# Patient Record
Sex: FEMALE | URBAN - METROPOLITAN AREA
[De-identification: names, ages, dates, MRNs, and addresses within clinical notes are randomized per-mention and may not be internally consistent; named-entity substitution may affect disease eponyms.]

---

## 2017-03-25 ENCOUNTER — INPATIENT (INPATIENT)
Facility: HOSPITAL | Age: 57
LOS: 2 days | Discharge: HOME | End: 2017-03-28
Attending: INTERNAL MEDICINE | Admitting: INTERNAL MEDICINE
Payer: MEDICAID

## 2017-03-25 VITALS
SYSTOLIC BLOOD PRESSURE: 163 MMHG | OXYGEN SATURATION: 100 % | HEART RATE: 67 BPM | RESPIRATION RATE: 18 BRPM | TEMPERATURE: 98 F | DIASTOLIC BLOOD PRESSURE: 102 MMHG | WEIGHT: 143.3 LBS

## 2017-03-25 DIAGNOSIS — I21.19 ST ELEVATION (STEMI) MYOCARDIAL INFARCTION INVOLVING OTHER CORONARY ARTERY OF INFERIOR WALL: ICD-10-CM

## 2017-03-25 DIAGNOSIS — R07.9 CHEST PAIN, UNSPECIFIED: ICD-10-CM

## 2017-03-25 LAB
ALBUMIN SERPL ELPH-MCNC: 4.3 G/DL — SIGNIFICANT CHANGE UP (ref 3.3–5)
ALP SERPL-CCNC: 80 U/L — SIGNIFICANT CHANGE UP (ref 40–120)
ALT FLD-CCNC: 30 U/L — SIGNIFICANT CHANGE UP (ref 4–33)
APTT BLD: 32.5 SEC — SIGNIFICANT CHANGE UP (ref 27.5–37.4)
AST SERPL-CCNC: 32 U/L — SIGNIFICANT CHANGE UP (ref 4–32)
BASOPHILS # BLD AUTO: 0.03 K/UL — SIGNIFICANT CHANGE UP (ref 0–0.2)
BASOPHILS NFR BLD AUTO: 0.3 % — SIGNIFICANT CHANGE UP (ref 0–2)
BILIRUB SERPL-MCNC: 0.4 MG/DL — SIGNIFICANT CHANGE UP (ref 0.2–1.2)
BLD GP AB SCN SERPL QL: NEGATIVE — SIGNIFICANT CHANGE UP
BUN SERPL-MCNC: 17 MG/DL — SIGNIFICANT CHANGE UP (ref 7–23)
CALCIUM SERPL-MCNC: 9.1 MG/DL — SIGNIFICANT CHANGE UP (ref 8.4–10.5)
CHLORIDE SERPL-SCNC: 95 MMOL/L — LOW (ref 98–107)
CK MB BLD-MCNC: 2.97 NG/ML — SIGNIFICANT CHANGE UP (ref 1–4.7)
CK MB BLD-MCNC: 359.3 NG/ML — HIGH (ref 1–4.7)
CK MB BLD-MCNC: SIGNIFICANT CHANGE UP (ref 0–2.5)
CK SERPL-CCNC: 1881 U/L — HIGH (ref 25–170)
CK SERPL-CCNC: 57 U/L — SIGNIFICANT CHANGE UP (ref 25–170)
CO2 SERPL-SCNC: 23 MMOL/L — SIGNIFICANT CHANGE UP (ref 22–31)
CREAT SERPL-MCNC: 0.78 MG/DL — SIGNIFICANT CHANGE UP (ref 0.5–1.3)
EOSINOPHIL # BLD AUTO: 0.07 K/UL — SIGNIFICANT CHANGE UP (ref 0–0.5)
EOSINOPHIL NFR BLD AUTO: 0.6 % — SIGNIFICANT CHANGE UP (ref 0–6)
GLUCOSE SERPL-MCNC: 265 MG/DL — HIGH (ref 70–99)
HBA1C BLD-MCNC: 8.5 % — HIGH (ref 4–5.6)
HCT VFR BLD CALC: 41.5 % — SIGNIFICANT CHANGE UP (ref 34.5–45)
HGB BLD-MCNC: 13.5 G/DL — SIGNIFICANT CHANGE UP (ref 11.5–15.5)
IMM GRANULOCYTES NFR BLD AUTO: 0.7 % — SIGNIFICANT CHANGE UP (ref 0–1.5)
INR BLD: 0.99 — SIGNIFICANT CHANGE UP (ref 0.88–1.17)
LYMPHOCYTES # BLD AUTO: 2.39 K/UL — SIGNIFICANT CHANGE UP (ref 1–3.3)
LYMPHOCYTES # BLD AUTO: 22.1 % — SIGNIFICANT CHANGE UP (ref 13–44)
MCHC RBC-ENTMCNC: 24.4 PG — LOW (ref 27–34)
MCHC RBC-ENTMCNC: 32.5 % — SIGNIFICANT CHANGE UP (ref 32–36)
MCV RBC AUTO: 74.9 FL — LOW (ref 80–100)
MONOCYTES # BLD AUTO: 0.38 K/UL — SIGNIFICANT CHANGE UP (ref 0–0.9)
MONOCYTES NFR BLD AUTO: 3.5 % — SIGNIFICANT CHANGE UP (ref 2–14)
NEUTROPHILS # BLD AUTO: 7.88 K/UL — HIGH (ref 1.8–7.4)
NEUTROPHILS NFR BLD AUTO: 72.8 % — SIGNIFICANT CHANGE UP (ref 43–77)
PLATELET # BLD AUTO: 223 K/UL — SIGNIFICANT CHANGE UP (ref 150–400)
PMV BLD: 10.7 FL — SIGNIFICANT CHANGE UP (ref 7–13)
POTASSIUM SERPL-MCNC: 4.4 MMOL/L — SIGNIFICANT CHANGE UP (ref 3.5–5.3)
POTASSIUM SERPL-SCNC: 4.4 MMOL/L — SIGNIFICANT CHANGE UP (ref 3.5–5.3)
PROT SERPL-MCNC: 7.6 G/DL — SIGNIFICANT CHANGE UP (ref 6–8.3)
PROTHROM AB SERPL-ACNC: 11.1 SEC — SIGNIFICANT CHANGE UP (ref 9.8–13.1)
RBC # BLD: 5.54 M/UL — HIGH (ref 3.8–5.2)
RBC # FLD: 15.5 % — HIGH (ref 10.3–14.5)
RH IG SCN BLD-IMP: POSITIVE — SIGNIFICANT CHANGE UP
SODIUM SERPL-SCNC: 136 MMOL/L — SIGNIFICANT CHANGE UP (ref 135–145)
TROPONIN T SERPL-MCNC: 5.11 NG/ML — HIGH (ref 0–0.06)
TROPONIN T SERPL-MCNC: < 0.06 NG/ML — SIGNIFICANT CHANGE UP (ref 0–0.06)
WBC # BLD: 10.83 K/UL — HIGH (ref 3.8–10.5)
WBC # FLD AUTO: 10.83 K/UL — HIGH (ref 3.8–10.5)

## 2017-03-25 PROCEDURE — 71010: CPT | Mod: 26

## 2017-03-25 PROCEDURE — 93458 L HRT ARTERY/VENTRICLE ANGIO: CPT | Mod: 26,59

## 2017-03-25 PROCEDURE — 92941 PRQ TRLML REVSC TOT OCCL AMI: CPT | Mod: LC

## 2017-03-25 PROCEDURE — 93010 ELECTROCARDIOGRAM REPORT: CPT

## 2017-03-25 RX ORDER — ASPIRIN/CALCIUM CARB/MAGNESIUM 324 MG
324 TABLET ORAL ONCE
Qty: 0 | Refills: 0 | Status: COMPLETED | OUTPATIENT
Start: 2017-03-25 | End: 2017-03-25

## 2017-03-25 RX ORDER — DEXTROSE 50 % IN WATER 50 %
25 SYRINGE (ML) INTRAVENOUS ONCE
Qty: 0 | Refills: 0 | Status: DISCONTINUED | OUTPATIENT
Start: 2017-03-25 | End: 2017-03-28

## 2017-03-25 RX ORDER — INSULIN LISPRO 100/ML
VIAL (ML) SUBCUTANEOUS
Qty: 0 | Refills: 0 | Status: DISCONTINUED | OUTPATIENT
Start: 2017-03-25 | End: 2017-03-28

## 2017-03-25 RX ORDER — DEXTROSE 50 % IN WATER 50 %
12.5 SYRINGE (ML) INTRAVENOUS ONCE
Qty: 0 | Refills: 0 | Status: DISCONTINUED | OUTPATIENT
Start: 2017-03-25 | End: 2017-03-28

## 2017-03-25 RX ORDER — TICAGRELOR 90 MG/1
90 TABLET ORAL
Qty: 0 | Refills: 0 | Status: DISCONTINUED | OUTPATIENT
Start: 2017-03-26 | End: 2017-03-28

## 2017-03-25 RX ORDER — HEPARIN SODIUM 5000 [USP'U]/ML
5000 INJECTION INTRAVENOUS; SUBCUTANEOUS EVERY 8 HOURS
Qty: 0 | Refills: 0 | Status: DISCONTINUED | OUTPATIENT
Start: 2017-03-25 | End: 2017-03-28

## 2017-03-25 RX ORDER — SODIUM CHLORIDE 9 MG/ML
1000 INJECTION, SOLUTION INTRAVENOUS
Qty: 0 | Refills: 0 | Status: DISCONTINUED | OUTPATIENT
Start: 2017-03-25 | End: 2017-03-28

## 2017-03-25 RX ORDER — ASPIRIN/CALCIUM CARB/MAGNESIUM 324 MG
325 TABLET ORAL ONCE
Qty: 0 | Refills: 0 | Status: DISCONTINUED | OUTPATIENT
Start: 2017-03-25 | End: 2017-03-25

## 2017-03-25 RX ORDER — HEPARIN SODIUM 5000 [USP'U]/ML
5000 INJECTION INTRAVENOUS; SUBCUTANEOUS ONCE
Qty: 0 | Refills: 0 | Status: COMPLETED | OUTPATIENT
Start: 2017-03-25 | End: 2017-03-25

## 2017-03-25 RX ORDER — ASPIRIN/CALCIUM CARB/MAGNESIUM 324 MG
81 TABLET ORAL DAILY
Qty: 0 | Refills: 0 | Status: DISCONTINUED | OUTPATIENT
Start: 2017-03-26 | End: 2017-03-28

## 2017-03-25 RX ORDER — DEXTROSE 50 % IN WATER 50 %
1 SYRINGE (ML) INTRAVENOUS ONCE
Qty: 0 | Refills: 0 | Status: DISCONTINUED | OUTPATIENT
Start: 2017-03-25 | End: 2017-03-28

## 2017-03-25 RX ORDER — INFLUENZA VIRUS VACCINE 15; 15; 15; 15 UG/.5ML; UG/.5ML; UG/.5ML; UG/.5ML
0.5 SUSPENSION INTRAMUSCULAR ONCE
Qty: 0 | Refills: 0 | Status: COMPLETED | OUTPATIENT
Start: 2017-03-25 | End: 2017-03-28

## 2017-03-25 RX ORDER — INSULIN LISPRO 100/ML
VIAL (ML) SUBCUTANEOUS AT BEDTIME
Qty: 0 | Refills: 0 | Status: DISCONTINUED | OUTPATIENT
Start: 2017-03-25 | End: 2017-03-28

## 2017-03-25 RX ORDER — GLUCAGON INJECTION, SOLUTION 0.5 MG/.1ML
1 INJECTION, SOLUTION SUBCUTANEOUS ONCE
Qty: 0 | Refills: 0 | Status: DISCONTINUED | OUTPATIENT
Start: 2017-03-25 | End: 2017-03-28

## 2017-03-25 RX ORDER — TICAGRELOR 90 MG/1
180 TABLET ORAL ONCE
Qty: 0 | Refills: 0 | Status: COMPLETED | OUTPATIENT
Start: 2017-03-25 | End: 2017-03-25

## 2017-03-25 RX ORDER — ATORVASTATIN CALCIUM 80 MG/1
80 TABLET, FILM COATED ORAL AT BEDTIME
Qty: 0 | Refills: 0 | Status: DISCONTINUED | OUTPATIENT
Start: 2017-03-25 | End: 2017-03-28

## 2017-03-25 RX ORDER — ASPIRIN/CALCIUM CARB/MAGNESIUM 324 MG
364 TABLET ORAL DAILY
Qty: 0 | Refills: 0 | Status: DISCONTINUED | OUTPATIENT
Start: 2017-03-25 | End: 2017-03-25

## 2017-03-25 RX ADMIN — ATORVASTATIN CALCIUM 80 MILLIGRAM(S): 80 TABLET, FILM COATED ORAL at 22:31

## 2017-03-25 RX ADMIN — HEPARIN SODIUM 30 UNIT(S): 5000 INJECTION INTRAVENOUS; SUBCUTANEOUS at 11:08

## 2017-03-25 RX ADMIN — HEPARIN SODIUM 5000 UNIT(S): 5000 INJECTION INTRAVENOUS; SUBCUTANEOUS at 22:31

## 2017-03-25 RX ADMIN — Medication 324 MILLIGRAM(S): at 11:05

## 2017-03-25 RX ADMIN — Medication 1: at 17:57

## 2017-03-25 RX ADMIN — HEPARIN SODIUM 5000 UNIT(S): 5000 INJECTION INTRAVENOUS; SUBCUTANEOUS at 14:42

## 2017-03-25 RX ADMIN — TICAGRELOR 180 MILLIGRAM(S): 90 TABLET ORAL at 11:05

## 2017-03-25 NOTE — H&P ADULT. - PROBLEM SELECTOR PLAN 1
Admit to CCU, trend enzymes post PCI to OM, continue DAPT, lipitor, eventual start ACE and betablocker, ECHO

## 2017-03-25 NOTE — ED PROVIDER NOTE - PROGRESS NOTE DETAILS
Dr. Hodge: Signed EKG which shows JARED inferior, pt assessed, c/o nonradiating cp, no other complaints, no PMHx. Pt immediately placed in wheelchair and wheeled over to room 18 and discussed with Dr. Kenny, who will call 22 for STEMI alert. Cardiology contacted, cath lab activated, pt received brilinta, heparin, aspirin, SL nitro Pt transported to cath lab

## 2017-03-25 NOTE — ED ADULT TRIAGE NOTE - CHIEF COMPLAINT QUOTE
Co midsternal chest pain radiating to left side of chest since yesterday. Co sob. Vomited x1 this morning. Denies medical hx.

## 2017-03-25 NOTE — ED ADULT NURSE NOTE - CAS DISCH TRANSFER METHOD
to [facility] cath Lab, report given to RN from [facility] to [facility] cath Lab, report given to RN from CCU St[facility], accompanied by LETHA Ibarra and cardiologist to Cath Lab

## 2017-03-25 NOTE — ED PROVIDER NOTE - ATTENDING CONTRIBUTION TO CARE
57yo  F, denies PMH, c/o episodic sharp sternal CP yest, 5-10min., then would resolve for 30min or more, on and off all day, thought worse with minor exertion around home. Went to bed pain-free, awoke with severe pain, sweats, vomiting, pain improved but did not resolve and came to ED. Appears comfortable at rest, no diaphoresis at bedside, clear lungs, NT CW, no murmur, soft, sl. distended, NT abd, NT calves, no edema.

## 2017-03-25 NOTE — ED ADULT NURSE REASSESSMENT NOTE - NS ED NURSE REASSESS COMMENT FT1
pt coming with Mid. sternal CP interm. since yest. with persistant pain this AM with + nausea vomited few times pt AOX 3, all extremities + for equal movements, no slur peach/no facial drop noted.     Denies dizziness/SOB at present time.   repeat EKG done with "STEMI" Cardiology team call by Attending,  all labs done, S.L. to left AC 20g angio cath.   1 Nitro S.L. given by Efraín place pt on CM, oxygen 2-3l cn in place.  Meds given by Primary RN, pt transported by Cath Lab team in stable condition.   Consent signed/pt's belongings with pt.    Layne Barry RN (facilitatory )

## 2017-03-25 NOTE — ED PROVIDER NOTE - CRITICAL CARE PROVIDED
consultation with other physicians/documentation/direct patient care (not related to procedure)/additional history taking/interpretation of diagnostic studies

## 2017-03-25 NOTE — PATIENT PROFILE ADULT. - NS PRO INDICAT FLU
Patient/surrogate requesting vaccine/Patient is 18 to 64 years of age with chronic diseases or conditions

## 2017-03-25 NOTE — ED PROVIDER NOTE - OBJECTIVE STATEMENT
55 yo F no PMH p/w CP began yesterday that was waxes and waning that she awoke this AM that did not resolve. Pain severe, sub-sternal came on when pt was doing physical activity, un-relenting with tylenol, EKG showed inferior wall STEMI. 55 yo F no PMH p/w CP began yesterday that was waxes and waning that she awoke this AM with more severe pain, diaphoresis and vomiting episode. Pain cont. in ED, milder, sub-sternal, rad. sl. to L side, un-relenting with tylenol, EKG showed inferior wall STEMI.

## 2017-03-25 NOTE — H&P ADULT. - HISTORY OF PRESENT ILLNESS
56F with no known medical history presents with one h/o chest pain increasing in intensity and associated with SOB. EKG in ER showed IWSTEMI, patient taken to cath lab where one MYRIAM was placed to OM, LAD with 100% blockage but with collaterals. Patient transferred to CCU in stable condition. 56F with no known medical history presents with one day h/o chest pain increasing in intensity and associated with SOB. EKG in ER showed IWSTEMI, patient taken to cath lab where one MYRIAM was placed to OM, LAD with 100% blockage but with collaterals. Patient transferred to CCU in stable condition.

## 2017-03-25 NOTE — ED PROVIDER NOTE - MEDICAL DECISION MAKING DETAILS
55yo F with 1mm inf wall JARED, reciprocal changes I,L, scanned to cath team, will activate cath lab.

## 2017-03-26 LAB
ALBUMIN SERPL ELPH-MCNC: 3.7 G/DL — SIGNIFICANT CHANGE UP (ref 3.3–5)
ALP SERPL-CCNC: 69 U/L — SIGNIFICANT CHANGE UP (ref 40–120)
ALT FLD-CCNC: 44 U/L — HIGH (ref 4–33)
AST SERPL-CCNC: 142 U/L — HIGH (ref 4–32)
BILIRUB SERPL-MCNC: 0.6 MG/DL — SIGNIFICANT CHANGE UP (ref 0.2–1.2)
BUN SERPL-MCNC: 13 MG/DL — SIGNIFICANT CHANGE UP (ref 7–23)
CALCIUM SERPL-MCNC: 9 MG/DL — SIGNIFICANT CHANGE UP (ref 8.4–10.5)
CHLORIDE SERPL-SCNC: 102 MMOL/L — SIGNIFICANT CHANGE UP (ref 98–107)
CHOLEST SERPL-MCNC: 249 MG/DL — HIGH (ref 120–199)
CK MB BLD-MCNC: 13.9 — HIGH (ref 0–2.5)
CK MB BLD-MCNC: 16.6 — HIGH (ref 0–2.5)
CK MB BLD-MCNC: 164.8 NG/ML — HIGH (ref 1–4.7)
CK MB BLD-MCNC: 278.7 NG/ML — HIGH (ref 1–4.7)
CK SERPL-CCNC: 1183 U/L — HIGH (ref 25–170)
CK SERPL-CCNC: 1683 U/L — HIGH (ref 25–170)
CO2 SERPL-SCNC: 21 MMOL/L — LOW (ref 22–31)
CREAT SERPL-MCNC: 0.63 MG/DL — SIGNIFICANT CHANGE UP (ref 0.5–1.3)
GLUCOSE SERPL-MCNC: 188 MG/DL — HIGH (ref 70–99)
HCT VFR BLD CALC: 40.3 % — SIGNIFICANT CHANGE UP (ref 34.5–45)
HDLC SERPL-MCNC: 32 MG/DL — LOW (ref 45–65)
HGB BLD-MCNC: 13.4 G/DL — SIGNIFICANT CHANGE UP (ref 11.5–15.5)
LIPID PNL WITH DIRECT LDL SERPL: 168 MG/DL — SIGNIFICANT CHANGE UP
MAGNESIUM SERPL-MCNC: 1.8 MG/DL — SIGNIFICANT CHANGE UP (ref 1.6–2.6)
MCHC RBC-ENTMCNC: 24.7 PG — LOW (ref 27–34)
MCHC RBC-ENTMCNC: 33.3 % — SIGNIFICANT CHANGE UP (ref 32–36)
MCV RBC AUTO: 74.2 FL — LOW (ref 80–100)
PHOSPHATE SERPL-MCNC: 3.3 MG/DL — SIGNIFICANT CHANGE UP (ref 2.5–4.5)
PLATELET # BLD AUTO: 225 K/UL — SIGNIFICANT CHANGE UP (ref 150–400)
PMV BLD: 11.6 FL — SIGNIFICANT CHANGE UP (ref 7–13)
POTASSIUM SERPL-MCNC: 4.4 MMOL/L — SIGNIFICANT CHANGE UP (ref 3.5–5.3)
POTASSIUM SERPL-SCNC: 4.4 MMOL/L — SIGNIFICANT CHANGE UP (ref 3.5–5.3)
PROT SERPL-MCNC: 7 G/DL — SIGNIFICANT CHANGE UP (ref 6–8.3)
RBC # BLD: 5.43 M/UL — HIGH (ref 3.8–5.2)
RBC # FLD: 15.4 % — HIGH (ref 10.3–14.5)
SODIUM SERPL-SCNC: 141 MMOL/L — SIGNIFICANT CHANGE UP (ref 135–145)
T3 SERPL-MCNC: 95 NG/DL — SIGNIFICANT CHANGE UP (ref 80–200)
T4 AB SER-ACNC: 7.92 UG/DL — SIGNIFICANT CHANGE UP (ref 5.1–13)
TRIGL SERPL-MCNC: 414 MG/DL — HIGH (ref 10–149)
TROPONIN T SERPL-MCNC: 3.93 NG/ML — HIGH (ref 0–0.06)
TROPONIN T SERPL-MCNC: 6.04 NG/ML — HIGH (ref 0–0.06)
TSH SERPL-MCNC: 3.38 UIU/ML — SIGNIFICANT CHANGE UP (ref 0.27–4.2)
WBC # BLD: 9.84 K/UL — SIGNIFICANT CHANGE UP (ref 3.8–10.5)
WBC # FLD AUTO: 9.84 K/UL — SIGNIFICANT CHANGE UP (ref 3.8–10.5)

## 2017-03-26 PROCEDURE — 93010 ELECTROCARDIOGRAM REPORT: CPT

## 2017-03-26 PROCEDURE — 93306 TTE W/DOPPLER COMPLETE: CPT | Mod: 26

## 2017-03-26 RX ORDER — METOPROLOL TARTRATE 50 MG
12.5 TABLET ORAL
Qty: 0 | Refills: 0 | Status: DISCONTINUED | OUTPATIENT
Start: 2017-03-26 | End: 2017-03-28

## 2017-03-26 RX ADMIN — TICAGRELOR 90 MILLIGRAM(S): 90 TABLET ORAL at 05:58

## 2017-03-26 RX ADMIN — Medication: at 14:19

## 2017-03-26 RX ADMIN — ATORVASTATIN CALCIUM 80 MILLIGRAM(S): 80 TABLET, FILM COATED ORAL at 21:22

## 2017-03-26 RX ADMIN — Medication 12.5 MILLIGRAM(S): at 18:03

## 2017-03-26 RX ADMIN — Medication 1: at 09:56

## 2017-03-26 RX ADMIN — HEPARIN SODIUM 5000 UNIT(S): 5000 INJECTION INTRAVENOUS; SUBCUTANEOUS at 05:58

## 2017-03-26 RX ADMIN — TICAGRELOR 90 MILLIGRAM(S): 90 TABLET ORAL at 18:04

## 2017-03-26 RX ADMIN — Medication 12.5 MILLIGRAM(S): at 05:57

## 2017-03-26 RX ADMIN — HEPARIN SODIUM 5000 UNIT(S): 5000 INJECTION INTRAVENOUS; SUBCUTANEOUS at 14:28

## 2017-03-26 RX ADMIN — Medication 81 MILLIGRAM(S): at 12:26

## 2017-03-26 RX ADMIN — HEPARIN SODIUM 5000 UNIT(S): 5000 INJECTION INTRAVENOUS; SUBCUTANEOUS at 21:22

## 2017-03-27 LAB
ALBUMIN SERPL ELPH-MCNC: 3.8 G/DL — SIGNIFICANT CHANGE UP (ref 3.3–5)
ALP SERPL-CCNC: 75 U/L — SIGNIFICANT CHANGE UP (ref 40–120)
ALT FLD-CCNC: 37 U/L — HIGH (ref 4–33)
AST SERPL-CCNC: 65 U/L — HIGH (ref 4–32)
BILIRUB SERPL-MCNC: 0.9 MG/DL — SIGNIFICANT CHANGE UP (ref 0.2–1.2)
BUN SERPL-MCNC: 16 MG/DL — SIGNIFICANT CHANGE UP (ref 7–23)
CALCIUM SERPL-MCNC: 9.3 MG/DL — SIGNIFICANT CHANGE UP (ref 8.4–10.5)
CHLORIDE SERPL-SCNC: 100 MMOL/L — SIGNIFICANT CHANGE UP (ref 98–107)
CK MB BLD-MCNC: 15.18 NG/ML — HIGH (ref 1–4.7)
CK SERPL-CCNC: 346 U/L — HIGH (ref 25–170)
CO2 SERPL-SCNC: 20 MMOL/L — LOW (ref 22–31)
CREAT SERPL-MCNC: 0.78 MG/DL — SIGNIFICANT CHANGE UP (ref 0.5–1.3)
GLUCOSE SERPL-MCNC: 182 MG/DL — HIGH (ref 70–99)
HCT VFR BLD CALC: 42 % — SIGNIFICANT CHANGE UP (ref 34.5–45)
HGB BLD-MCNC: 13.8 G/DL — SIGNIFICANT CHANGE UP (ref 11.5–15.5)
MCHC RBC-ENTMCNC: 24.7 PG — LOW (ref 27–34)
MCHC RBC-ENTMCNC: 32.9 % — SIGNIFICANT CHANGE UP (ref 32–36)
MCV RBC AUTO: 75.1 FL — LOW (ref 80–100)
PLATELET # BLD AUTO: 245 K/UL — SIGNIFICANT CHANGE UP (ref 150–400)
PMV BLD: 11.7 FL — SIGNIFICANT CHANGE UP (ref 7–13)
POTASSIUM SERPL-MCNC: 4.5 MMOL/L — SIGNIFICANT CHANGE UP (ref 3.5–5.3)
POTASSIUM SERPL-SCNC: 4.5 MMOL/L — SIGNIFICANT CHANGE UP (ref 3.5–5.3)
PROT SERPL-MCNC: 7.5 G/DL — SIGNIFICANT CHANGE UP (ref 6–8.3)
RBC # BLD: 5.59 M/UL — HIGH (ref 3.8–5.2)
RBC # FLD: 15.3 % — HIGH (ref 10.3–14.5)
SODIUM SERPL-SCNC: 141 MMOL/L — SIGNIFICANT CHANGE UP (ref 135–145)
TROPONIN T SERPL-MCNC: 2.24 NG/ML — HIGH (ref 0–0.06)
WBC # BLD: 13.48 K/UL — HIGH (ref 3.8–10.5)
WBC # FLD AUTO: 13.48 K/UL — HIGH (ref 3.8–10.5)

## 2017-03-27 PROCEDURE — 93010 ELECTROCARDIOGRAM REPORT: CPT

## 2017-03-27 PROCEDURE — 99233 SBSQ HOSP IP/OBS HIGH 50: CPT

## 2017-03-27 RX ORDER — METFORMIN HYDROCHLORIDE 850 MG/1
1 TABLET ORAL
Qty: 60 | Refills: 0 | OUTPATIENT
Start: 2017-03-27 | End: 2017-04-26

## 2017-03-27 RX ORDER — METOPROLOL TARTRATE 50 MG
0.5 TABLET ORAL
Qty: 30 | Refills: 0 | OUTPATIENT
Start: 2017-03-27 | End: 2017-04-26

## 2017-03-27 RX ORDER — ATORVASTATIN CALCIUM 80 MG/1
1 TABLET, FILM COATED ORAL
Qty: 30 | Refills: 0 | OUTPATIENT
Start: 2017-03-27 | End: 2017-04-26

## 2017-03-27 RX ORDER — TICAGRELOR 90 MG/1
1 TABLET ORAL
Qty: 60 | Refills: 0 | OUTPATIENT
Start: 2017-03-27 | End: 2017-04-26

## 2017-03-27 RX ORDER — ASPIRIN/CALCIUM CARB/MAGNESIUM 324 MG
1 TABLET ORAL
Qty: 30 | Refills: 0 | OUTPATIENT
Start: 2017-03-27 | End: 2017-04-26

## 2017-03-27 RX ADMIN — Medication 1: at 09:00

## 2017-03-27 RX ADMIN — Medication 81 MILLIGRAM(S): at 11:49

## 2017-03-27 RX ADMIN — Medication 12.5 MILLIGRAM(S): at 18:19

## 2017-03-27 RX ADMIN — HEPARIN SODIUM 5000 UNIT(S): 5000 INJECTION INTRAVENOUS; SUBCUTANEOUS at 22:05

## 2017-03-27 RX ADMIN — Medication 12.5 MILLIGRAM(S): at 05:45

## 2017-03-27 RX ADMIN — TICAGRELOR 90 MILLIGRAM(S): 90 TABLET ORAL at 05:45

## 2017-03-27 RX ADMIN — HEPARIN SODIUM 5000 UNIT(S): 5000 INJECTION INTRAVENOUS; SUBCUTANEOUS at 05:45

## 2017-03-27 RX ADMIN — HEPARIN SODIUM 5000 UNIT(S): 5000 INJECTION INTRAVENOUS; SUBCUTANEOUS at 15:00

## 2017-03-27 RX ADMIN — ATORVASTATIN CALCIUM 80 MILLIGRAM(S): 80 TABLET, FILM COATED ORAL at 22:05

## 2017-03-27 RX ADMIN — Medication 3: at 12:45

## 2017-03-27 RX ADMIN — TICAGRELOR 90 MILLIGRAM(S): 90 TABLET ORAL at 18:19

## 2017-03-27 NOTE — DISCHARGE NOTE ADULT - SECONDARY DIAGNOSIS.
Type 2 diabetes mellitus Type 2 diabetes mellitus with hyperglycemia, without long-term current use of insulin

## 2017-03-27 NOTE — DIETITIAN INITIAL EVALUATION ADULT. - OTHER INFO
Nutrition consult received on 3/25/17 for Hemoglobin A1C > 7% . Pt. reports good appetite, PO intake, no known food allergies, was taking apple cider vinegar prior to admission for wt. loss. Was not on therapeutic diet , is newly diagnosed diabetic , received therapeutic diet instruction.

## 2017-03-27 NOTE — DISCHARGE NOTE ADULT - PATIENT PORTAL LINK FT
“You can access the Follow[employer] Patient Portal, offered by [facility], by registering with the following website: [email or web address]”

## 2017-03-27 NOTE — DISCHARGE NOTE ADULT - CARE PROVIDERS DIRECT ADDRESSES
[email or web address]

## 2017-03-27 NOTE — DISCHARGE NOTE ADULT - MEDICATION SUMMARY - MEDICATIONS TO TAKE
I will START or STAY ON the medications listed below when I get home from the hospital:    Lancets  -- Lancets   Test blood sugar #_2_ times daily  Disp  #_3_ boxes    -- Indication: For T2DM    test strips  -- Glucose test strips   Test blood sugar #_2_ times daily  Dis_3  boxes    -- Indication: For Type 2 diabetes mellitus    Glucometer  -- Medtronic glucometer  -- Indication: For Type 2 diabetes mellitus    aspirin 81 mg oral delayed release tablet  -- 1 tab(s) by mouth once a day  -- Indication: For CAD    enalapril 5 mg oral tablet  -- 1 tab(s) by mouth once a day  -- Indication: For CAD    metFORMIN 500 mg oral tablet, extended release  -- 1 tab(s) by mouth 2 times a day  -- Check with your doctor before becoming pregnant.  Do not drink alcoholic beverages when taking this medication.  Obtain medical advice before taking any non-prescription drugs as some may affect the action of this medication.  Swallow whole.  Do not crush.  Take with food or milk.    -- Indication: For T2DM    atorvastatin 80 mg oral tablet  -- 1 tab(s) by mouth once a day (at bedtime)  -- Indication: For CAD    ticagrelor 90 mg oral tablet  -- 1 tab(s) by mouth 2 times a day  -- Indication: For CAD    metoprolol tartrate 25 mg oral tablet  -- 0.5 tab(s) by mouth 2 times a day  -- It is very important that you take or use this exactly as directed.  Do not skip doses or discontinue unless directed by your doctor.  May cause drowsiness.  Alcohol may intensify this effect.  Use care when operating dangerous machinery.  Some non-prescription drugs may aggravate your condition.  Read all labels carefully.  If a warning appears, check with your doctor before taking.  Take with food or milk.  This drug may impair the ability to drive or operate machinery.  Use care until you become familiar with its effects.    -- Indication: For CAD

## 2017-03-27 NOTE — DISCHARGE NOTE ADULT - PLAN OF CARE
Medical management You had a cardiac catheterization performed due to your IWSTEMI. It showed occlusion of OM prompting MYRIAM placement along with near occlusion of LAD with collateral flow. You were monitored for 72 hr following the MI with no further events of chest pain. You have been started on Aspirin, brilinta, lipitor, metoprolol and enalapril for management of your CAD. Please follow up with cardiologist for further management. If you reexperience chest pain please seek emergent medical attention. Your A1C is elevated to 8.5 confirming a diagnosis of diabetes mellitus. Please take your metformin as prescribed to provide blood glucose control. Please measure your blood sugar as educated two times a day. Follow up with your PCP Dr. Maria Esther Rojo on 3/30 at 11:30 to discuss your diagnosis and management going forward. You had a cardiac catheterization performed due to your IWSTEMI. It showed occlusion of OM prompting MYRIAM placement along with near occlusion of LAD with collateral flow. You were monitored for 72 hr following the MI with no further events of chest pain. You have been started on Aspirin, brilinta, lipitor, metoprolol and enalapril for management of your CAD. Please follow up with Dr. Medel for further evaluation of need for further intervention and a cardiologist in future for further management . If you reexperience chest pain or SOB on exertion please seek emergent medical attention.

## 2017-03-27 NOTE — DISCHARGE NOTE ADULT - HOSPITAL COURSE
56F with no known medical history presents with one day h/o chest pain increasing in intensity and associated with SOB. EKG in ER showed IWSTEMI, patient taken to cath lab where one MYRIAM was placed to OM, LAD with 100% blockage but with collaterals. Patient transferred to CCU in stable condition.  Patient continued to be stable in post-MI period. Angioseal was placed on R groin and pt initially had pain but it resolved 56F with no known medical history presents with one day h/o chest pain increasing in intensity and associated with SOB. EKG in ER showed IWSTEMI, patient taken to cath lab where one MYRIAM was placed to OM, LAD with 100% blockage but with collaterals. Patient transferred to CCU in stable condition.  Patient continued to be stable in post-MI period. Angioseal was placed on R groin and pt initially had pain but it resolved. Her A1C was elevated to 8.5 making her newly dx T2DM prompting diabetes education. After discussion with the interventional cardiologist staging procedure was decided against. Patient was monitored for full 72 hrs following IWSTEMI and was cleared for discharge by cardiology. 56F with no known medical history presents with one day h/o chest pain increasing in intensity and associated with SOB. EKG in ER showed IWSTEMI, patient taken to cath lab where one MYRIAM was placed to OM, LAD with 100% blockage but with collaterals. Patient transferred to CCU in stable condition.  Patient continued to be stable in post-MI period. Angioseal was placed on R groin and pt initially had pain but it resolved. Her A1C was elevated to 8.5 making her newly dx T2DM prompting diabetes education and prescribing metformin on discharge. After discussion with the interventional cardiologist staging procedure was decided against. Patient was monitored for full 72 hrs following IWSTEMI and was cleared for discharge by cardiology.

## 2017-03-27 NOTE — DISCHARGE NOTE ADULT - NS AS DC FOLLOWUP STROKE INST
MI/Diabetes, diet and exercise/Smoking Cessation Influenza vaccination ([facility] Pub Date: August 19, 2014)/Diabetes, diet and exercise/MI/Smoking Cessation

## 2017-03-27 NOTE — DISCHARGE NOTE ADULT - CARE PLAN
Principal Discharge DX:	Acute myocardial infarction of inferior wall, initial episode of care  Goal:	Medical management  Instructions for follow-up, activity and diet:	You had a cardiac catheterization performed due to your IWSTEMI. It showed occlusion of OM prompting MYRIAM placement along with near occlusion of LAD with collateral flow. You were monitored for 72 hr following the MI with no further events of chest pain. You have been started on Aspirin, brilinta, lipitor, metoprolol and enalapril for management of your CAD. Please follow up with cardiologist for further management. If you reexperience chest pain please seek emergent medical attention.  Secondary Diagnosis:	Type 2 diabetes mellitus Principal Discharge DX:	Acute myocardial infarction of inferior wall, initial episode of care  Goal:	Medical management  Instructions for follow-up, activity and diet:	You had a cardiac catheterization performed due to your IWSTEMI. It showed occlusion of OM prompting MYRIAM placement along with near occlusion of LAD with collateral flow. You were monitored for 72 hr following the MI with no further events of chest pain. You have been started on Aspirin, brilinta, lipitor, metoprolol and enalapril for management of your CAD. Please follow up with Dr. Medel for further evaluation of need for further intervention and a cardiologist in future for further management . If you reexperience chest pain or SOB on exertion please seek emergent medical attention.  Secondary Diagnosis:	Type 2 diabetes mellitus  Instructions for follow-up, activity and diet:	Your A1C is elevated to 8.5 confirming a diagnosis of diabetes mellitus. Please take your metformin as prescribed to provide blood glucose control. Please measure your blood sugar as educated two times a day. Follow up with your PCP Dr. Maria Esther Rojo on 3/30 at 11:30 to discuss your diagnosis and management going forward. Principal Discharge DX:	Acute myocardial infarction of inferior wall, initial episode of care  Goal:	Medical management  Instructions for follow-up, activity and diet:	You had a cardiac catheterization performed due to your IWSTEMI. It showed occlusion of OM prompting MYRIAM placement along with near occlusion of LAD with collateral flow. You were monitored for 72 hr following the MI with no further events of chest pain. You have been started on Aspirin, brilinta, lipitor, metoprolol and enalapril for management of your CAD. Please follow up with Dr. Medel for further evaluation of need for further intervention and a cardiologist in future for further management . If you reexperience chest pain or SOB on exertion please seek emergent medical attention.  Secondary Diagnosis:	Type 2 diabetes mellitus with hyperglycemia, without long-term current use of insulin  Instructions for follow-up, activity and diet:	Your A1C is elevated to 8.5 confirming a diagnosis of diabetes mellitus. Please take your metformin as prescribed to provide blood glucose control. Please measure your blood sugar as educated two times a day. Follow up with your PCP Dr. Maria Esther Rojo on 3/30 at 11:30 to discuss your diagnosis and management going forward.

## 2017-03-27 NOTE — DISCHARGE NOTE ADULT - CARE PROVIDER_API CALL
Jeffy Medel), Cardiovascular Disease; Internal Medicine; Interventional Cardiology  [address] [address] [place] [id number]  Phone: [phone]  Fax: [phone]    Maria Esther Rojo), Internal Medicine  [address] [place] [address]  Phone: [phone]  Fax: [phone]

## 2017-03-27 NOTE — DISCHARGE NOTE ADULT - PROCEDURE 1
Catheterization of left heart with coronary angiography and percutaneous coronary intervention (PCI) if indicated

## 2017-03-28 VITALS
OXYGEN SATURATION: 98 % | DIASTOLIC BLOOD PRESSURE: 62 MMHG | RESPIRATION RATE: 18 BRPM | SYSTOLIC BLOOD PRESSURE: 112 MMHG | TEMPERATURE: 98 F | HEART RATE: 91 BPM

## 2017-03-28 LAB
BASOPHILS # BLD AUTO: 0.04 K/UL — SIGNIFICANT CHANGE UP (ref 0–0.2)
BASOPHILS NFR BLD AUTO: 0.3 % — SIGNIFICANT CHANGE UP (ref 0–2)
BUN SERPL-MCNC: 13 MG/DL — SIGNIFICANT CHANGE UP (ref 7–23)
CALCIUM SERPL-MCNC: 8.9 MG/DL — SIGNIFICANT CHANGE UP (ref 8.4–10.5)
CHLORIDE SERPL-SCNC: 101 MMOL/L — SIGNIFICANT CHANGE UP (ref 98–107)
CO2 SERPL-SCNC: 21 MMOL/L — LOW (ref 22–31)
CREAT SERPL-MCNC: 0.71 MG/DL — SIGNIFICANT CHANGE UP (ref 0.5–1.3)
EOSINOPHIL # BLD AUTO: 0.21 K/UL — SIGNIFICANT CHANGE UP (ref 0–0.5)
EOSINOPHIL NFR BLD AUTO: 1.8 % — SIGNIFICANT CHANGE UP (ref 0–6)
GLUCOSE SERPL-MCNC: 174 MG/DL — HIGH (ref 70–99)
HCT VFR BLD CALC: 39.7 % — SIGNIFICANT CHANGE UP (ref 34.5–45)
HGB BLD-MCNC: 13.1 G/DL — SIGNIFICANT CHANGE UP (ref 11.5–15.5)
IMM GRANULOCYTES NFR BLD AUTO: 1.8 % — HIGH (ref 0–1.5)
LYMPHOCYTES # BLD AUTO: 3.51 K/UL — HIGH (ref 1–3.3)
LYMPHOCYTES # BLD AUTO: 30.6 % — SIGNIFICANT CHANGE UP (ref 13–44)
MAGNESIUM SERPL-MCNC: 1.7 MG/DL — SIGNIFICANT CHANGE UP (ref 1.6–2.6)
MCHC RBC-ENTMCNC: 24.8 PG — LOW (ref 27–34)
MCHC RBC-ENTMCNC: 33 % — SIGNIFICANT CHANGE UP (ref 32–36)
MCV RBC AUTO: 75.2 FL — LOW (ref 80–100)
MONOCYTES # BLD AUTO: 0.51 K/UL — SIGNIFICANT CHANGE UP (ref 0–0.9)
MONOCYTES NFR BLD AUTO: 4.4 % — SIGNIFICANT CHANGE UP (ref 2–14)
NEUTROPHILS # BLD AUTO: 6.99 K/UL — SIGNIFICANT CHANGE UP (ref 1.8–7.4)
NEUTROPHILS NFR BLD AUTO: 61.1 % — SIGNIFICANT CHANGE UP (ref 43–77)
PHOSPHATE SERPL-MCNC: 3.2 MG/DL — SIGNIFICANT CHANGE UP (ref 2.5–4.5)
PLATELET # BLD AUTO: 219 K/UL — SIGNIFICANT CHANGE UP (ref 150–400)
PMV BLD: 11.7 FL — SIGNIFICANT CHANGE UP (ref 7–13)
POTASSIUM SERPL-MCNC: 4.2 MMOL/L — SIGNIFICANT CHANGE UP (ref 3.5–5.3)
POTASSIUM SERPL-SCNC: 4.2 MMOL/L — SIGNIFICANT CHANGE UP (ref 3.5–5.3)
RBC # BLD: 5.28 M/UL — HIGH (ref 3.8–5.2)
RBC # FLD: 15.5 % — HIGH (ref 10.3–14.5)
SODIUM SERPL-SCNC: 139 MMOL/L — SIGNIFICANT CHANGE UP (ref 135–145)
WBC # BLD: 11.47 K/UL — HIGH (ref 3.8–10.5)
WBC # FLD AUTO: 11.47 K/UL — HIGH (ref 3.8–10.5)

## 2017-03-28 PROCEDURE — 99239 HOSP IP/OBS DSCHRG MGMT >30: CPT

## 2017-03-28 PROCEDURE — 93010 ELECTROCARDIOGRAM REPORT: CPT

## 2017-03-28 RX ORDER — TICAGRELOR 90 MG/1
1 TABLET ORAL
Qty: 60 | Refills: 0 | OUTPATIENT
Start: 2017-03-28 | End: 2017-04-27

## 2017-03-28 RX ORDER — METFORMIN HYDROCHLORIDE 850 MG/1
1 TABLET ORAL
Qty: 62 | Refills: 0 | OUTPATIENT
Start: 2017-03-28 | End: 2017-04-28

## 2017-03-28 RX ADMIN — Medication 3: at 11:36

## 2017-03-28 RX ADMIN — HEPARIN SODIUM 5000 UNIT(S): 5000 INJECTION INTRAVENOUS; SUBCUTANEOUS at 06:37

## 2017-03-28 RX ADMIN — Medication 5 MILLIGRAM(S): at 06:37

## 2017-03-28 RX ADMIN — TICAGRELOR 90 MILLIGRAM(S): 90 TABLET ORAL at 06:37

## 2017-03-28 RX ADMIN — Medication 1: at 08:41

## 2017-03-28 RX ADMIN — Medication 81 MILLIGRAM(S): at 11:35

## 2017-03-28 RX ADMIN — Medication 12.5 MILLIGRAM(S): at 06:38

## 2017-03-28 RX ADMIN — INFLUENZA VIRUS VACCINE 0.5 MILLILITER(S): 15; 15; 15; 15 SUSPENSION INTRAMUSCULAR at 10:12

## 2019-01-15 NOTE — ED ADULT NURSE NOTE - NS ED NOTE  TALK SOMEONE YN

## 2022-12-13 NOTE — ED PROVIDER NOTE - MUSCULOSKELETAL, MLM
Spine appears normal, range of motion is not limited, no muscle or joint tenderness Unable to assess